# Patient Record
Sex: FEMALE | Race: OTHER | NOT HISPANIC OR LATINO
[De-identification: names, ages, dates, MRNs, and addresses within clinical notes are randomized per-mention and may not be internally consistent; named-entity substitution may affect disease eponyms.]

---

## 2024-07-02 ENCOUNTER — APPOINTMENT (OUTPATIENT)
Dept: SURGICAL ONCOLOGY | Facility: CLINIC | Age: 49
End: 2024-07-02

## 2024-07-02 ENCOUNTER — RESULT REVIEW (OUTPATIENT)
Age: 49
End: 2024-07-02

## 2024-07-02 PROCEDURE — 99205 OFFICE O/P NEW HI 60 MIN: CPT | Mod: 25

## 2024-07-02 PROCEDURE — 10005 FNA BX W/US GDN 1ST LES: CPT

## 2024-07-02 PROCEDURE — 10006 FNA BX W/US GDN EA ADDL: CPT

## 2024-07-03 PROBLEM — Z00.00 ENCOUNTER FOR PREVENTIVE HEALTH EXAMINATION: Status: ACTIVE | Noted: 2024-07-03

## 2024-07-09 ENCOUNTER — APPOINTMENT (OUTPATIENT)
Dept: SURGICAL ONCOLOGY | Facility: CLINIC | Age: 49
End: 2024-07-09
Payer: COMMERCIAL

## 2024-07-09 PROCEDURE — 99214 OFFICE O/P EST MOD 30 MIN: CPT

## 2024-07-19 ENCOUNTER — OUTPATIENT (OUTPATIENT)
Dept: OUTPATIENT SERVICES | Facility: HOSPITAL | Age: 49
LOS: 1 days | End: 2024-07-19
Payer: MEDICAID

## 2024-07-19 VITALS
DIASTOLIC BLOOD PRESSURE: 71 MMHG | WEIGHT: 154.1 LBS | SYSTOLIC BLOOD PRESSURE: 108 MMHG | TEMPERATURE: 97 F | RESPIRATION RATE: 19 BRPM | HEIGHT: 60 IN | HEART RATE: 65 BPM | OXYGEN SATURATION: 97 %

## 2024-07-19 DIAGNOSIS — C73 MALIGNANT NEOPLASM OF THYROID GLAND: ICD-10-CM

## 2024-07-19 DIAGNOSIS — Z90.49 ACQUIRED ABSENCE OF OTHER SPECIFIED PARTS OF DIGESTIVE TRACT: Chronic | ICD-10-CM

## 2024-07-19 DIAGNOSIS — Z98.890 OTHER SPECIFIED POSTPROCEDURAL STATES: Chronic | ICD-10-CM

## 2024-07-19 DIAGNOSIS — Z01.818 ENCOUNTER FOR OTHER PREPROCEDURAL EXAMINATION: ICD-10-CM

## 2024-07-19 LAB
APTT BLD: 32.4 SEC — SIGNIFICANT CHANGE UP (ref 24.5–35.6)
BLD GP AB SCN SERPL QL: SIGNIFICANT CHANGE UP
HCT VFR BLD CALC: 37.9 % — SIGNIFICANT CHANGE UP (ref 34.5–45)
HGB BLD-MCNC: 12.5 G/DL — SIGNIFICANT CHANGE UP (ref 11.5–15.5)
INR BLD: 1.02 RATIO — SIGNIFICANT CHANGE UP (ref 0.85–1.18)
MCHC RBC-ENTMCNC: 28 PG — SIGNIFICANT CHANGE UP (ref 27–34)
MCHC RBC-ENTMCNC: 33 GM/DL — SIGNIFICANT CHANGE UP (ref 32–36)
MCV RBC AUTO: 85 FL — SIGNIFICANT CHANGE UP (ref 80–100)
NRBC # BLD: 0 /100 WBCS — SIGNIFICANT CHANGE UP (ref 0–0)
PLATELET # BLD AUTO: 344 K/UL — SIGNIFICANT CHANGE UP (ref 150–400)
PROTHROM AB SERPL-ACNC: 11.6 SEC — SIGNIFICANT CHANGE UP (ref 9.5–13)
RBC # BLD: 4.46 M/UL — SIGNIFICANT CHANGE UP (ref 3.8–5.2)
RBC # FLD: 13.8 % — SIGNIFICANT CHANGE UP (ref 10.3–14.5)
T3 SERPL-MCNC: 111 NG/DL — SIGNIFICANT CHANGE UP (ref 80–200)
T4 AB SER-ACNC: 5.2 UG/DL — SIGNIFICANT CHANGE UP (ref 4.6–12)
TSH SERPL-MCNC: 5.51 UU/ML — HIGH (ref 0.34–4.82)
WBC # BLD: 5.91 K/UL — SIGNIFICANT CHANGE UP (ref 3.8–10.5)
WBC # FLD AUTO: 5.91 K/UL — SIGNIFICANT CHANGE UP (ref 3.8–10.5)

## 2024-07-19 NOTE — H&P PST ADULT - HISTORY OF PRESENT ILLNESS
48 y.o female with no PMHx, recently admitted and treated for [place] palsy 5/2024, On w/u found to  have Thyroid cancer and now for schedule Total Thyroidectomy on 7/29/24 with Dr Argueta

## 2024-07-19 NOTE — H&P PST ADULT - NEUROLOGICAL
details… sensation intact/responds to pain/responds to verbal commands recent bells palsy with left sided facial droop/sensation intact/responds to pain/responds to verbal commands

## 2024-07-19 NOTE — H&P PST ADULT - NSANTHAGERD_ENT_A_CORE
No

## 2024-07-19 NOTE — H&P PST ADULT - PROBLEM SELECTOR PLAN 1
Pt schedule for Total Thyroidectomy on 7/29/24 with Dr Argueta    Labs drawn by PCP - will f/u result  Pt was seen by PCP for Medical clearance - will f/u report    Pt was  instructed to stop aspirin/ecotrin and all over the counter medication including vitamins and herbal supplements one week prior to surgery   Instructions given on the use of 4% chlorhexidine wash and Pt verbalized understanding of same   Pt Instructed to have nothing by mouth starting midnight day before surgery  Patient is to expect a phone call day before surgery between the hours of 430- 630pm giving arrival time for surgery   Written and verbal preoperative instructions given to patient with understanding verbalized via  [facility] # [id number]    Patient today with STOP bang score 3  Low  risk for GILDARDO Pt schedule for Total Thyroidectomy on 7/29/24 with Dr Argueta    Labs drawn by PST - will f/u result  Pt instructed to f/u with PCP for Medical clearance - will f/u report    Pt was  instructed to stop aspirin/ecotrin and all over the counter medication including vitamins and herbal supplements one week prior to surgery   Instructions given on the use of 4% chlorhexidine wash and Pt verbalized understanding of same   Pt Instructed to have nothing by mouth starting midnight day before surgery  Patient is to expect a phone call day before surgery between the hours of 430- 630pm giving arrival time for surgery   Written and verbal preoperative instructions given to patient with understanding verbalized via  [facility] # [id number]    Patient today with STOP bang score 0  Low  risk for GILDARDO

## 2024-07-19 NOTE — H&P PST ADULT - ASSESSMENT
48 y.o female with no PMHx, recently admitted and treated for [place] palsy 5/2024, On w/u found to  have Thyroid cancer and now for schedule Total Thyroidectomy on 7/29/24 with Dr rAgueta 48 y.o female with Thyroid cancer and now for schedule Total Thyroidectomy on 7/29/24 with Dr Ellie IFNN 0

## 2024-07-19 NOTE — H&P PST ADULT - NSICDXPASTSURGICALHX_GEN_ALL_CORE_FT
PAST SURGICAL HISTORY:  H/O abdominoplasty     H/O bilateral breast reduction surgery     History of appendectomy     S/P cholecystectomy

## 2024-07-22 PROCEDURE — 85730 THROMBOPLASTIN TIME PARTIAL: CPT

## 2024-07-22 PROCEDURE — 86850 RBC ANTIBODY SCREEN: CPT

## 2024-07-22 PROCEDURE — 86901 BLOOD TYPING SEROLOGIC RH(D): CPT

## 2024-07-22 PROCEDURE — 36415 COLL VENOUS BLD VENIPUNCTURE: CPT

## 2024-07-22 PROCEDURE — 86900 BLOOD TYPING SEROLOGIC ABO: CPT

## 2024-07-22 PROCEDURE — G0463: CPT

## 2024-07-22 PROCEDURE — 84436 ASSAY OF TOTAL THYROXINE: CPT

## 2024-07-22 PROCEDURE — 85610 PROTHROMBIN TIME: CPT

## 2024-07-22 PROCEDURE — 84443 ASSAY THYROID STIM HORMONE: CPT

## 2024-07-22 PROCEDURE — 85027 COMPLETE CBC AUTOMATED: CPT

## 2024-07-22 PROCEDURE — 84480 ASSAY TRIIODOTHYRONINE (T3): CPT

## 2024-07-26 PROBLEM — Z86.69 PERSONAL HISTORY OF OTHER DISEASES OF THE NERVOUS SYSTEM AND SENSE ORGANS: Chronic | Status: ACTIVE | Noted: 2024-07-19

## 2024-07-29 ENCOUNTER — RESULT REVIEW (OUTPATIENT)
Age: 49
End: 2024-07-29

## 2024-07-29 ENCOUNTER — INPATIENT (INPATIENT)
Facility: HOSPITAL | Age: 49
LOS: 0 days | Discharge: HOME | DRG: 626 | End: 2024-07-30
Attending: SPECIALIST | Admitting: SPECIALIST
Payer: MEDICAID

## 2024-07-29 ENCOUNTER — APPOINTMENT (OUTPATIENT)
Dept: SURGICAL ONCOLOGY | Facility: HOSPITAL | Age: 49
End: 2024-07-29

## 2024-07-29 VITALS
RESPIRATION RATE: 16 BRPM | TEMPERATURE: 98 F | HEART RATE: 76 BPM | OXYGEN SATURATION: 98 % | DIASTOLIC BLOOD PRESSURE: 81 MMHG | WEIGHT: 154.1 LBS | SYSTOLIC BLOOD PRESSURE: 117 MMHG | HEIGHT: 60 IN

## 2024-07-29 DIAGNOSIS — Z90.49 ACQUIRED ABSENCE OF OTHER SPECIFIED PARTS OF DIGESTIVE TRACT: Chronic | ICD-10-CM

## 2024-07-29 DIAGNOSIS — C73 MALIGNANT NEOPLASM OF THYROID GLAND: ICD-10-CM

## 2024-07-29 DIAGNOSIS — Z98.890 OTHER SPECIFIED POSTPROCEDURAL STATES: Chronic | ICD-10-CM

## 2024-07-29 LAB
BLD GP AB SCN SERPL QL: SIGNIFICANT CHANGE UP
HCG UR QL: NEGATIVE — SIGNIFICANT CHANGE UP

## 2024-07-29 PROCEDURE — 88307 TISSUE EXAM BY PATHOLOGIST: CPT | Mod: 26

## 2024-07-29 PROCEDURE — 60252 REMOVAL OF THYROID: CPT

## 2024-07-29 PROCEDURE — 64716 REVISION OF CRANIAL NERVE: CPT

## 2024-07-29 DEVICE — CLIP APPLIER COVIDIEN SURGICLIP II 9.75" MEDIUM: Type: IMPLANTABLE DEVICE | Status: FUNCTIONAL

## 2024-07-29 DEVICE — CLIP APPLIER COVIDIEN SURGICLIP III 9" SM: Type: IMPLANTABLE DEVICE | Status: FUNCTIONAL

## 2024-07-29 DEVICE — SURGICEL FIBRILLAR 2 X 4": Type: IMPLANTABLE DEVICE | Status: FUNCTIONAL

## 2024-07-29 RX ORDER — ONDANSETRON HCL/PF 4 MG/2 ML
4 VIAL (ML) INJECTION EVERY 6 HOURS
Refills: 0 | Status: DISCONTINUED | OUTPATIENT
Start: 2024-07-29 | End: 2024-07-30

## 2024-07-29 RX ORDER — DEXTROSE MONOHYDRATE, SODIUM CHLORIDE, SODIUM LACTATE, CALCIUM CHLORIDE, MAGNESIUM CHLORIDE 1.5; 538; 448; 18.4; 5.08 G/100ML; MG/100ML; MG/100ML; MG/100ML; MG/100ML
1000 SOLUTION INTRAPERITONEAL
Refills: 0 | Status: DISCONTINUED | OUTPATIENT
Start: 2024-07-29 | End: 2024-07-29

## 2024-07-29 RX ORDER — LEVOTHYROXINE SODIUM 175 MCG
100 TABLET ORAL DAILY
Refills: 0 | Status: DISCONTINUED | OUTPATIENT
Start: 2024-07-29 | End: 2024-07-30

## 2024-07-29 RX ORDER — FENTANYL CITRATE 1200 UG/1
25 LOZENGE ORAL; TRANSMUCOSAL
Refills: 0 | Status: DISCONTINUED | OUTPATIENT
Start: 2024-07-29 | End: 2024-07-29

## 2024-07-29 RX ORDER — ONDANSETRON HCL/PF 4 MG/2 ML
4 VIAL (ML) INJECTION ONCE
Refills: 0 | Status: DISCONTINUED | OUTPATIENT
Start: 2024-07-29 | End: 2024-07-29

## 2024-07-29 RX ORDER — BACTERIOSTATIC SODIUM CHLORIDE 0.9 %
3 VIAL (ML) INJECTION EVERY 8 HOURS
Refills: 0 | Status: DISCONTINUED | OUTPATIENT
Start: 2024-07-29 | End: 2024-07-29

## 2024-07-29 RX ORDER — ACETAMINOPHEN 500 MG
650 TABLET ORAL EVERY 6 HOURS
Refills: 0 | Status: DISCONTINUED | OUTPATIENT
Start: 2024-07-29 | End: 2024-07-30

## 2024-07-29 RX ORDER — FENTANYL CITRATE 1200 UG/1
50 LOZENGE ORAL; TRANSMUCOSAL
Refills: 0 | Status: DISCONTINUED | OUTPATIENT
Start: 2024-07-29 | End: 2024-07-29

## 2024-07-29 RX ORDER — CALCIUM ACETATE 667 MG/5ML
667 SOLUTION ORAL
Refills: 0 | Status: DISCONTINUED | OUTPATIENT
Start: 2024-07-29 | End: 2024-07-30

## 2024-07-29 RX ORDER — CALCITRIOL CAPSULES 0.25 MCG 0.25 UG/1
0.25 CAPSULE ORAL
Refills: 0 | Status: DISCONTINUED | OUTPATIENT
Start: 2024-07-29 | End: 2024-07-30

## 2024-07-29 RX ADMIN — CALCITRIOL CAPSULES 0.25 MCG 0.25 MICROGRAM(S): 0.25 CAPSULE ORAL at 17:56

## 2024-07-29 RX ADMIN — FENTANYL CITRATE 25 MICROGRAM(S): 1200 LOZENGE ORAL; TRANSMUCOSAL at 12:35

## 2024-07-29 RX ADMIN — FENTANYL CITRATE 25 MICROGRAM(S): 1200 LOZENGE ORAL; TRANSMUCOSAL at 12:05

## 2024-07-29 RX ADMIN — Medication 650 MILLIGRAM(S): at 17:55

## 2024-07-29 RX ADMIN — CALCIUM ACETATE 667 MILLIGRAM(S): 667 SOLUTION ORAL at 17:55

## 2024-07-29 RX ADMIN — Medication 4 MILLIGRAM(S): at 16:17

## 2024-07-29 RX ADMIN — Medication 650 MILLIGRAM(S): at 18:55

## 2024-07-29 NOTE — ASU PATIENT PROFILE, ADULT - TEACHING/LEARNING FACTORS INFLUENCE READINESS TO LEARN
[facility] 77  coding opportunities       Chart reviewed, no opportunity found: CHART REVIEWED, NO OPPORTUNITY FOUND                        Patients insurance company: [employer] (Medicare Advantage and Commercial) acuteness of illness/interest in learning/motivation to learn

## 2024-07-29 NOTE — PACU DISCHARGE NOTE - COMMENTS
Clear for discharge from pacu. No anesthetic complications Clear for discharge from pacu. No anesthetic complications Transfer on continuos pulse ox and 2L NC O2.

## 2024-07-29 NOTE — CHART NOTE - NSCHARTNOTEFT_GEN_A_CORE
Patient seen and examined at bedside, now s/p total thyroidectomy. Patient with some pain at the surgical site, well controlled on current regimen. Patient denies any perioral tingling/numbness, nor at the fingertips. Patient phonating well. Patient tolerating clear liquid diet. Patient without any difficulty breathing, denies any specific complaints at this time.    Vital Signs Last 24 Hrs  T(C): 37.1 (29 Jul 2024 13:48), Max: 37.1 (29 Jul 2024 11:11)  T(F): 98.8 (29 Jul 2024 13:48), Max: 98.8 (29 Jul 2024 11:11)  HR: 83 (29 Jul 2024 13:48) (76 - 90)  BP: 113/72 (29 Jul 2024 13:48) (113/72 - 139/83)  BP(mean): 86 (29 Jul 2024 13:48) (86 - 96)  RR: 22 (29 Jul 2024 13:48) (15 - 22)  SpO2: 95% (29 Jul 2024 13:48) (90% - 98%)    Parameters below as of 29 Jul 2024 13:48  Patient On (Oxygen Delivery Method): nasal cannula  O2 Flow (L/min): 2    Physical  Gen: Well appearing, NAD  HEENT: neck with blue towel wrap and gauze underlying, slight serous spotting of gauze but otherwise intact  Resp: Normal WoB on RA  Ext: grossly intact str/sensation of b/l hands    48F now POD0 s/p total thyroidectomy for papillary thyroid cancer, progressing well  - Airway monitoring overnight  - Clear liquid diet  - Calcitriol and calcium supplementation  - synthroid initiated  - Dressing to remain in place overnight  - No chemical dvt ppx  - multimodal pain control  - IS  - OOBTC, ambulation, SCDs while in bed

## 2024-07-29 NOTE — PATIENT PROFILE ADULT - FALL HARM RISK - HARM RISK INTERVENTIONS

## 2024-07-30 ENCOUNTER — TRANSCRIPTION ENCOUNTER (OUTPATIENT)
Age: 49
End: 2024-07-30

## 2024-07-30 VITALS
DIASTOLIC BLOOD PRESSURE: 60 MMHG | TEMPERATURE: 99 F | RESPIRATION RATE: 18 BRPM | OXYGEN SATURATION: 96 % | SYSTOLIC BLOOD PRESSURE: 100 MMHG | HEART RATE: 83 BPM

## 2024-07-30 LAB
ANION GAP SERPL CALC-SCNC: 9 MMOL/L — SIGNIFICANT CHANGE UP (ref 5–17)
BASOPHILS # BLD AUTO: 0.02 K/UL — SIGNIFICANT CHANGE UP (ref 0–0.2)
BASOPHILS NFR BLD AUTO: 0.2 % — SIGNIFICANT CHANGE UP (ref 0–2)
BUN SERPL-MCNC: 10 MG/DL — SIGNIFICANT CHANGE UP (ref 7–18)
CALCIUM SERPL-MCNC: 9.2 MG/DL — SIGNIFICANT CHANGE UP (ref 8.4–10.5)
CHLORIDE SERPL-SCNC: 105 MMOL/L — SIGNIFICANT CHANGE UP (ref 96–108)
CO2 SERPL-SCNC: 23 MMOL/L — SIGNIFICANT CHANGE UP (ref 22–31)
CREAT SERPL-MCNC: 0.71 MG/DL — SIGNIFICANT CHANGE UP (ref 0.5–1.3)
EGFR: 106 ML/MIN/1.73M2 — SIGNIFICANT CHANGE UP
EOSINOPHIL # BLD AUTO: 0.02 K/UL — SIGNIFICANT CHANGE UP (ref 0–0.5)
EOSINOPHIL NFR BLD AUTO: 0.2 % — SIGNIFICANT CHANGE UP (ref 0–6)
GLUCOSE SERPL-MCNC: 119 MG/DL — HIGH (ref 70–99)
HCT VFR BLD CALC: 38.5 % — SIGNIFICANT CHANGE UP (ref 34.5–45)
HGB BLD-MCNC: 12.5 G/DL — SIGNIFICANT CHANGE UP (ref 11.5–15.5)
IMM GRANULOCYTES NFR BLD AUTO: 0.6 % — SIGNIFICANT CHANGE UP (ref 0–0.9)
LYMPHOCYTES # BLD AUTO: 1.98 K/UL — SIGNIFICANT CHANGE UP (ref 1–3.3)
LYMPHOCYTES # BLD AUTO: 17.9 % — SIGNIFICANT CHANGE UP (ref 13–44)
MCHC RBC-ENTMCNC: 28.1 PG — SIGNIFICANT CHANGE UP (ref 27–34)
MCHC RBC-ENTMCNC: 32.5 GM/DL — SIGNIFICANT CHANGE UP (ref 32–36)
MCV RBC AUTO: 86.5 FL — SIGNIFICANT CHANGE UP (ref 80–100)
MONOCYTES # BLD AUTO: 1.05 K/UL — HIGH (ref 0–0.9)
MONOCYTES NFR BLD AUTO: 9.5 % — SIGNIFICANT CHANGE UP (ref 2–14)
NEUTROPHILS # BLD AUTO: 7.93 K/UL — HIGH (ref 1.8–7.4)
NEUTROPHILS NFR BLD AUTO: 71.6 % — SIGNIFICANT CHANGE UP (ref 43–77)
NRBC # BLD: 0 /100 WBCS — SIGNIFICANT CHANGE UP (ref 0–0)
PLATELET # BLD AUTO: 354 K/UL — SIGNIFICANT CHANGE UP (ref 150–400)
POTASSIUM SERPL-MCNC: 4.2 MMOL/L — SIGNIFICANT CHANGE UP (ref 3.5–5.3)
POTASSIUM SERPL-SCNC: 4.2 MMOL/L — SIGNIFICANT CHANGE UP (ref 3.5–5.3)
RBC # BLD: 4.45 M/UL — SIGNIFICANT CHANGE UP (ref 3.8–5.2)
RBC # FLD: 14.7 % — HIGH (ref 10.3–14.5)
SODIUM SERPL-SCNC: 137 MMOL/L — SIGNIFICANT CHANGE UP (ref 135–145)
WBC # BLD: 11.07 K/UL — HIGH (ref 3.8–10.5)
WBC # FLD AUTO: 11.07 K/UL — HIGH (ref 3.8–10.5)

## 2024-07-30 PROCEDURE — 86850 RBC ANTIBODY SCREEN: CPT

## 2024-07-30 PROCEDURE — 81025 URINE PREGNANCY TEST: CPT

## 2024-07-30 PROCEDURE — 99238 HOSP IP/OBS DSCHRG MGMT 30/<: CPT

## 2024-07-30 PROCEDURE — 88307 TISSUE EXAM BY PATHOLOGIST: CPT

## 2024-07-30 PROCEDURE — C1889: CPT

## 2024-07-30 PROCEDURE — 85025 COMPLETE CBC W/AUTO DIFF WBC: CPT

## 2024-07-30 PROCEDURE — 36415 COLL VENOUS BLD VENIPUNCTURE: CPT

## 2024-07-30 PROCEDURE — 80048 BASIC METABOLIC PNL TOTAL CA: CPT

## 2024-07-30 PROCEDURE — 86901 BLOOD TYPING SEROLOGIC RH(D): CPT

## 2024-07-30 PROCEDURE — 86900 BLOOD TYPING SEROLOGIC ABO: CPT

## 2024-07-30 RX ORDER — OXYCODONE HYDROCHLORIDE 30 MG/1
5 TABLET ORAL EVERY 6 HOURS
Refills: 0 | Status: DISCONTINUED | OUTPATIENT
Start: 2024-07-30 | End: 2024-07-30

## 2024-07-30 RX ORDER — CALCITRIOL CAPSULES 0.25 MCG 0.25 UG/1
1 CAPSULE ORAL
Qty: 28 | Refills: 0
Start: 2024-07-30 | End: 2024-08-12

## 2024-07-30 RX ORDER — LEVOTHYROXINE SODIUM 175 MCG
1 TABLET ORAL
Qty: 30 | Refills: 0
Start: 2024-07-30 | End: 2024-08-28

## 2024-07-30 RX ORDER — CALCIUM ACETATE 667 MG/5ML
1 SOLUTION ORAL
Qty: 28 | Refills: 0
Start: 2024-07-30 | End: 2024-08-12

## 2024-07-30 RX ADMIN — Medication 650 MILLIGRAM(S): at 12:49

## 2024-07-30 RX ADMIN — Medication 650 MILLIGRAM(S): at 13:24

## 2024-07-30 RX ADMIN — Medication 650 MILLIGRAM(S): at 05:57

## 2024-07-30 RX ADMIN — OXYCODONE HYDROCHLORIDE 5 MILLIGRAM(S): 30 TABLET ORAL at 05:57

## 2024-07-30 RX ADMIN — Medication 100 MICROGRAM(S): at 04:58

## 2024-07-30 RX ADMIN — CALCIUM ACETATE 667 MILLIGRAM(S): 667 SOLUTION ORAL at 08:46

## 2024-07-30 RX ADMIN — Medication 650 MILLIGRAM(S): at 06:47

## 2024-07-30 RX ADMIN — CALCITRIOL CAPSULES 0.25 MCG 0.25 MICROGRAM(S): 0.25 CAPSULE ORAL at 05:55

## 2024-07-30 NOTE — DISCHARGE NOTE PROVIDER - HOSPITAL COURSE
47yo F with papillary neoplasia of bilateral thyroid lobes. Patient underwent total thyroidectomy, patient tolerated procedure well. Patient was admitted overnight for airwas monitoring and pain control. Post-op course uncomplicated, tolerated regular diet with minimal discomfort. Patient discharged home on POD 1

## 2024-07-30 NOTE — DISCHARGE NOTE NURSING/CASE MANAGEMENT/SOCIAL WORK - NSDCPEFALRISK_GEN_ALL_CORE
For information on Fall & Injury Prevention, visit: [email or web address] OR  [email or web address] OR  https://www.cdc.gov/steadi/patient.html

## 2024-07-30 NOTE — DISCHARGE NOTE PROVIDER - CARE PROVIDER_API CALL
Hardy Argueta  Surgery  [address] [place] [id number]  Phone: [phone]  Fax: [phone]  Established Patient  Follow Up Time: 2 weeks

## 2024-07-30 NOTE — PROGRESS NOTE ADULT - SUBJECTIVE AND OBJECTIVE BOX
Vital Signs Last 24 Hrs  T(C): 36.7 (30 Jul 2024 05:44), Max: 37.2 (29 Jul 2024 20:33)  T(F): 98.1 (30 Jul 2024 05:44), Max: 99 (29 Jul 2024 20:33)  HR: 62 (30 Jul 2024 05:44) (62 - 95)  BP: 111/75 (30 Jul 2024 05:44) (105/59 - 139/83)  BP(mean): 74 (30 Jul 2024 00:30) (74 - 96)  RR: 18 (30 Jul 2024 05:44) (15 - 22)  SpO2: 96% (30 Jul 2024 05:44) (90% - 98%)    Parameters below as of 30 Jul 2024 05:44  Patient On (Oxygen Delivery Method): room air        I&O's Detail    29 Jul 2024 07:01  -  30 Jul 2024 07:00  --------------------------------------------------------  IN:    Lactated Ringers Bolus: 1450 mL  Total IN: 1450 mL    OUT:    Voided (mL): 230 mL  Total OUT: 230 mL    Total NET: 1220 mL                                12.5   11.07 )-----------( 354      ( 30 Jul 2024 06:10 )             38.5       07-30    137  |  105  |  10  ----------------------------<  119<H>  4.2   |  23  |  0.71    Ca    9.2      30 Jul 2024 06:10  - chvostek  incision clear            PLAN:  discharge home today on synthroid , rocaltrol and calcium  RTO 2 weeks  instructions given

## 2024-07-30 NOTE — DISCHARGE NOTE PROVIDER - NSDCCPCAREPLAN_GEN_ALL_CORE_FT
PRINCIPAL DISCHARGE DIAGNOSIS  Diagnosis: Papillary carcinoma of thyroid  Assessment and Plan of Treatment:

## 2024-07-30 NOTE — DISCHARGE NOTE PROVIDER - ATTENDING ATTESTATION STATEMENT
H/O: hypothyroidism  as per   Reflux  as per , reports intermittant. On no medication I have personally seen and examined the patient. I have collaborated with and supervised the

## 2024-07-30 NOTE — DISCHARGE NOTE PROVIDER - NSDCFUADDINST_GEN_ALL_CORE_FT
Alternate Tylenol 650mg every 6 hours and Motrin 600mg every 6 hours for mild to moderate pain.  Oxycodone 5mg every 6 hours as needed for severe pain    New medications to take:  Calcitriol 0.25 mcg oral capsule: 1 cap(s) orally 2 times a day  Calcium acetate 667 mg oral tablet: 1 tab(s) orally 2 times a day  Levothyroxine 100 mcg (0.1 mg) oral tablet: 1 tab(s) orally once a day    Avoid lifting heavy weights or stretching your neck.    You may begin showering Wednesday afternoon, but leave the paper strips in place as they will fall off on their own. Allow warm soapy water to run over wound, and pat dry. Do not rub wound, and no soaking.    Follow-up in clinic in two weeks.

## 2024-07-30 NOTE — DISCHARGE NOTE NURSING/CASE MANAGEMENT/SOCIAL WORK - PATIENT PORTAL LINK FT
You can access the FollowMyHealth Patient Portal offered by [facility] by registering at the following website: [email or web address]. By joining [facility]’s FollowMyHealth portal, you will also be able to view your health information using other applications (apps) compatible with our system.

## 2024-07-30 NOTE — DISCHARGE NOTE PROVIDER - NSDCMRMEDTOKEN_GEN_ALL_CORE_FT
calcitriol 0.25 mcg oral capsule: 1 cap(s) orally 2 times a day  calcium acetate 667 mg oral tablet: 1 tab(s) orally 2 times a day  levothyroxine 100 mcg (0.1 mg) oral tablet: 1 tab(s) orally once a day

## 2024-08-06 PROBLEM — C73 PAPILLARY THYROID CARCINOMA: Status: ACTIVE | Noted: 2024-08-06

## 2024-08-07 LAB — SURGICAL PATHOLOGY STUDY: SIGNIFICANT CHANGE UP

## 2024-08-12 ENCOUNTER — NON-APPOINTMENT (OUTPATIENT)
Age: 49
End: 2024-08-12

## 2024-08-13 ENCOUNTER — APPOINTMENT (OUTPATIENT)
Dept: SURGICAL ONCOLOGY | Facility: CLINIC | Age: 49
End: 2024-08-13
Payer: COMMERCIAL

## 2024-08-13 VITALS
BODY MASS INDEX: 28.07 KG/M2 | OXYGEN SATURATION: 98 % | WEIGHT: 143 LBS | HEART RATE: 97 BPM | SYSTOLIC BLOOD PRESSURE: 124 MMHG | HEIGHT: 60 IN | DIASTOLIC BLOOD PRESSURE: 74 MMHG | RESPIRATION RATE: 16 BRPM

## 2024-08-13 DIAGNOSIS — C73 MALIGNANT NEOPLASM OF THYROID GLAND: ICD-10-CM

## 2024-08-13 DIAGNOSIS — Z78.9 OTHER SPECIFIED HEALTH STATUS: ICD-10-CM

## 2024-08-13 PROCEDURE — 99024 POSTOP FOLLOW-UP VISIT: CPT

## 2024-08-13 RX ORDER — LEVOTHYROXINE SODIUM 0.1 MG/1
100 TABLET ORAL DAILY
Qty: 30 | Refills: 11 | Status: ACTIVE | COMMUNITY
Start: 2024-08-13 | End: 1900-01-01

## 2024-08-13 NOTE — HISTORY OF PRESENT ILLNESS
[de-identified] : Ms. LATASHA ECKERT is a 48-year-old woman here for a post-op visit.  PMH/PSH unremarkable  thyroid & neck U/S 6/2024 - left upper pole nodule - 1.5 cm nodule, TIRADS 4, FNA recommended - right pole - 3.9 cm nodule, TIRADS 5, FNA recommended  s/p thyroid FNA on 7/2/2024 - right mid: positive for malignancy (c/w papillary thyroid carcinoma) (category VI) - left upper: suspicious for papillary thyroid carcinoma (category V)  Latasha is s/p total thyroidectomy on 7/29/2024, path: multifocal papillary cancer with a central lymph node with metastsis

## 2024-08-13 NOTE — CONSULT LETTER
[Dear  ___] : Dear  [unfilled], [Consult Letter:] : I had the pleasure of evaluating your patient, [unfilled]. [Please see my note below.] : Please see my note below. [Consult Closing:] : Thank you very much for allowing me to participate in the care of this patient.  If you have any questions, please do not hesitate to contact me. [Sincerely,] : Sincerely, [Courtesy Letter:] : I had the pleasure of seeing your patient, [unfilled], in my office today. [FreeTextEntry2] : Trinh Lee MD [FreeTextEntry1] : I will keep you informed of my follow-up. [FreeTextEntry3] : Hardy Argueta MD FACS Chief of Surgical Oncology

## 2024-08-13 NOTE — HISTORY OF PRESENT ILLNESS
[de-identified] : Ms. LATASHA ECKERT is a 48-year-old woman here for a post-op visit.  PMH/PSH unremarkable  thyroid & neck U/S 6/2024 - left upper pole nodule - 1.5 cm nodule, TIRADS 4, FNA recommended - right pole - 3.9 cm nodule, TIRADS 5, FNA recommended  s/p thyroid FNA on 7/2/2024 - right mid: positive for malignancy (c/w papillary thyroid carcinoma) (category VI) - left upper: suspicious for papillary thyroid carcinoma (category V)  Latasha is s/p total thyroidectomy on 7/29/2024, path: multifocal papillary cancer with a central lymph node with metastsis

## 2024-08-13 NOTE — ASSESSMENT
[FreeTextEntry1] : IMP: 47yo F w/ papillary thyroid carcinoma on bx   s/p thyroid FNA on 7/2/2024 - right mid: positive for malignancy (c/w papillary thyroid carcinoma) (category VI) - left upper: suspicious for papillary thyroid carcinoma (category V)  Latasha is s/p total thyroidectomy on 7/29/2024, path: multifocal papillary cancer with central sarah metastsis  PLAN: [facility] with Dr. Agueda Jack RTO 3 months

## 2024-11-19 ENCOUNTER — APPOINTMENT (OUTPATIENT)
Dept: SURGICAL ONCOLOGY | Facility: CLINIC | Age: 49
End: 2024-11-19
Payer: COMMERCIAL

## 2024-11-19 VITALS
HEART RATE: 72 BPM | RESPIRATION RATE: 16 BRPM | HEIGHT: 60 IN | DIASTOLIC BLOOD PRESSURE: 70 MMHG | SYSTOLIC BLOOD PRESSURE: 113 MMHG | WEIGHT: 143 LBS | OXYGEN SATURATION: 98 % | BODY MASS INDEX: 28.07 KG/M2

## 2024-11-19 DIAGNOSIS — C73 MALIGNANT NEOPLASM OF THYROID GLAND: ICD-10-CM

## 2024-11-19 PROCEDURE — 99214 OFFICE O/P EST MOD 30 MIN: CPT

## 2025-02-06 NOTE — H&P PST ADULT - NSALCOHOLUSECOMMENT_GEN_ALL_CORE_FT
[Former] : former [TextBox_4] : DEAN YEE is a 47 year female with history of asthma who presents to the office for follow up evaluation. DEAN is overall feeling well at this time; no respiratory complaints. Jeannine notes that she did not get her refill for symbicort.  She is a former smoker. [YearQuit] : 2022 denies

## 2025-05-27 ENCOUNTER — APPOINTMENT (OUTPATIENT)
Dept: SURGICAL ONCOLOGY | Facility: CLINIC | Age: 50
End: 2025-05-27
Payer: COMMERCIAL

## 2025-05-27 VITALS
HEART RATE: 76 BPM | OXYGEN SATURATION: 97 % | BODY MASS INDEX: 28.07 KG/M2 | DIASTOLIC BLOOD PRESSURE: 72 MMHG | RESPIRATION RATE: 16 BRPM | SYSTOLIC BLOOD PRESSURE: 128 MMHG | WEIGHT: 143 LBS | HEIGHT: 60 IN

## 2025-05-27 DIAGNOSIS — C73 MALIGNANT NEOPLASM OF THYROID GLAND: ICD-10-CM

## 2025-05-27 PROCEDURE — 99214 OFFICE O/P EST MOD 30 MIN: CPT

## 2025-08-12 ENCOUNTER — APPOINTMENT (OUTPATIENT)
Dept: SURGICAL ONCOLOGY | Facility: CLINIC | Age: 50
End: 2025-08-12
Payer: COMMERCIAL

## 2025-08-12 VITALS
BODY MASS INDEX: 28.07 KG/M2 | HEIGHT: 60 IN | SYSTOLIC BLOOD PRESSURE: 133 MMHG | RESPIRATION RATE: 16 BRPM | HEART RATE: 89 BPM | DIASTOLIC BLOOD PRESSURE: 60 MMHG | WEIGHT: 143 LBS | OXYGEN SATURATION: 96 %

## 2025-08-12 DIAGNOSIS — C73 MALIGNANT NEOPLASM OF THYROID GLAND: ICD-10-CM

## 2025-08-12 PROCEDURE — 99214 OFFICE O/P EST MOD 30 MIN: CPT

## (undated) DEVICE — LIGASURE EXACT DISSECTOR

## (undated) DEVICE — GLV 7 PROTEXIS (WHITE)

## (undated) DEVICE — SPONGE DISSECTOR PEANUT

## (undated) DEVICE — GLV 7.5 PROTEXIS (BLUE)

## (undated) DEVICE — DRAPE LAPAROTOMY TRANSVERSE

## (undated) DEVICE — NDL HYPO SAFE 25G X 1.5" (ORANGE)

## (undated) DEVICE — DRAPE TOWEL BLUE 17" X 24"

## (undated) DEVICE — SUT SILK 2-0 18" TIES

## (undated) DEVICE — WARMING BLANKET FULL ADULT

## (undated) DEVICE — BLADE SURGICAL #15 CARBON

## (undated) DEVICE — FOR-ESU VALLEYLAB T7E14848DX: Type: DURABLE MEDICAL EQUIPMENT

## (undated) DEVICE — SUT SOFSILK 2-0 30" V-20

## (undated) DEVICE — SOL IRR POUR NS 0.9% 1500ML

## (undated) DEVICE — PACK MINOR NO DRAPE

## (undated) DEVICE — POSITIONER FOAM HEAD DONUT 9" (PINK)

## (undated) DEVICE — DRSG TRACH DRAINAGE 4X4

## (undated) DEVICE — DRAPE 1/2 SHEET 40X57"

## (undated) DEVICE — DRSG CURITY GAUZE SPONGE 4 X 4" 12-PLY

## (undated) DEVICE — WAVEGUIDE NARROW FLAT

## (undated) DEVICE — SUT BIOSYN 4-0 18" P-12

## (undated) DEVICE — INVUITY ILLUMINATOR EIGR WAVEGUIDE, WIDE/FLAT DISP

## (undated) DEVICE — VENODYNE/SCD SLEEVE CALF MEDIUM

## (undated) DEVICE — ELCTR GROUNDING PAD ADULT COVIDIEN

## (undated) DEVICE — SUT POLYSORB 3-0 30" V-20 UNDYED

## (undated) DEVICE — SOL IRR POUR H2O 1500ML